# Patient Record
(demographics unavailable — no encounter records)

---

## 2025-02-03 NOTE — PHYSICAL EXAM
[Well Developed] : well developed [Well Nourished] : well nourished [No Acute Distress] : no acute distress [Normal Conjunctiva] : normal conjunctiva [Normal Venous Pressure] : normal venous pressure [No Carotid Bruit] : no carotid bruit [Normal S1, S2] : normal S1, S2 [No Murmur] : no murmur [No Rub] : no rub [No Gallop] : no gallop [Good Air Entry] : good air entry [No Respiratory Distress] : no respiratory distress  [Marj ____] : marj [unfilled] [Soft] : abdomen soft [Non Tender] : non-tender [No Masses/organomegaly] : no masses/organomegaly [Normal Bowel Sounds] : normal bowel sounds [Normal Gait] : normal gait [No Edema] : no edema [No Cyanosis] : no cyanosis [No Clubbing] : no clubbing [No Varicosities] : no varicosities [Moves all extremities] : moves all extremities [No Focal Deficits] : no focal deficits [Normal Speech] : normal speech [Alert and Oriented] : alert and oriented [Normal memory] : normal memory

## 2025-02-03 NOTE — HISTORY OF PRESENT ILLNESS
[FreeTextEntry1] : 61 year-old male with DM presents for evaluation of CP and SOB.  Patient reports 2 weeks ago while rushing to catch the bus in a cold day he experienced SSCP and SOB lasting 10 minutes.  He called his son for help over the phone.  Son noticed him to be weak and have trouble speaking.  Son came to him and made sure he was OK.  He declined ER visit and went to work.  At baseline, he reports SOB with running.  Patient denies palpitations.  Patient denies h/o syncope.  He thinks he has asthma and uses inhaler as needed.  Patient reports phlegm.  He is on ASA 81 mg for cardioprotection.  He is on 2 DM medications.  He is on 1 HLD medication.  He is on 1 BP medication for maite protection.  BP 13/82 HR 74.  Exam showed occasional rhonchi.  ECG showed NSR, no STTw changes.  I advised patient to undergo an echocardiogram and a treadmill stress test.  I advised patient to have a CXR to rule out lung pathology.  I advised patient to followup with pulmonology for possible reactive airway disease.